# Patient Record
Sex: FEMALE | Race: OTHER | HISPANIC OR LATINO
[De-identification: names, ages, dates, MRNs, and addresses within clinical notes are randomized per-mention and may not be internally consistent; named-entity substitution may affect disease eponyms.]

---

## 2019-03-11 PROBLEM — Z00.00 ENCOUNTER FOR PREVENTIVE HEALTH EXAMINATION: Status: ACTIVE | Noted: 2019-03-11

## 2019-03-19 ENCOUNTER — APPOINTMENT (OUTPATIENT)
Dept: COLORECTAL SURGERY | Facility: CLINIC | Age: 64
End: 2019-03-19
Payer: MEDICAID

## 2019-03-19 VITALS
BODY MASS INDEX: 29.23 KG/M2 | SYSTOLIC BLOOD PRESSURE: 147 MMHG | HEIGHT: 59 IN | DIASTOLIC BLOOD PRESSURE: 78 MMHG | HEART RATE: 73 BPM | WEIGHT: 145 LBS | RESPIRATION RATE: 14 BRPM

## 2019-03-19 DIAGNOSIS — Z78.9 OTHER SPECIFIED HEALTH STATUS: ICD-10-CM

## 2019-03-19 DIAGNOSIS — Z86.79 PERSONAL HISTORY OF OTHER DISEASES OF THE CIRCULATORY SYSTEM: ICD-10-CM

## 2019-03-19 PROCEDURE — 99205 OFFICE O/P NEW HI 60 MIN: CPT

## 2019-03-19 RX ORDER — LISINOPRIL 10 MG/1
10 TABLET ORAL
Refills: 0 | Status: ACTIVE | COMMUNITY

## 2019-03-19 NOTE — ASSESSMENT
[FreeTextEntry1] : 63-year-old female with a large sigmoid colon mass/polyp highly suspicious for cancer. Recommend laparoscopic possible open sigmoid colon resection.Risk and benefits of the surgery have been discussed which include bleeding, infection, sepsis, multiorgan failure, inadvertent injury including hollow viscus, solid organ, ureter neurovascular and neurological structures, DVT PE, heart attack, stroke, hernias, recurrence of tumor, leakage of anastomosis requiring ostomy and death.

## 2019-03-19 NOTE — HISTORY OF PRESENT ILLNESS
[FreeTextEntry1] : 63-year-old  female found on colonoscopy in November of 2018 to have a very large mass/polyp of the sigmoid colon. Patient had issues with insurance and did not follow up with other doctors. She is seeing me for the first time today. She didn't currently denies any abdominal pain constipation nausea or vomiting or bleeding

## 2019-03-19 NOTE — CONSULT LETTER
[Consult Letter:] : I had the pleasure of evaluating your patient, [unfilled]. [Dear  ___] : Dear  [unfilled], [Please see my note below.] : Please see my note below. [Consult Closing:] : Thank you very much for allowing me to participate in the care of this patient.  If you have any questions, please do not hesitate to contact me. [FreeTextEntry3] : Vlad Hernandez M.D. FACS, FASCRS  [Sincerely,] : Sincerely,

## 2019-03-19 NOTE — DATA REVIEWED
[FreeTextEntry1] : Colonoscopy demonstrates a 5 cm mass in sigmoid colon\par CT scan demonstrates thickening of the sigmoid colon, no evidence of metastasis

## 2019-03-19 NOTE — PHYSICAL EXAM
[Abdomen Masses] : No abdominal masses [Abdomen Tenderness] : ~T No ~M abdominal tenderness [Tender] : nontender [None] : there was no rectal mass  [JVD] : no jugular venous distention  [Normal Breath Sounds] : Normal breath sounds [Normal Heart Sounds] : normal heart sounds [Normal Rate and Rhythm] : normal rate and rhythm [No Rash or Lesion] : No rash or lesion [Alert] : alert [Oriented to Person] : oriented to person [Oriented to Place] : oriented to place [Oriented to Time] : oriented to time [Calm] : calm [de-identified] : Obese, midline incision scar [de-identified] : Looks well in no distress, of stated age. [de-identified] : pupils equal reactive to light normocephalic atraumatic. [de-identified] : moves all 4 extremities appropriately with 5 over 5 strength

## 2019-04-01 ENCOUNTER — OUTPATIENT (OUTPATIENT)
Dept: OUTPATIENT SERVICES | Facility: HOSPITAL | Age: 64
LOS: 1 days | End: 2019-04-01
Payer: MEDICAID

## 2019-04-01 PROCEDURE — G9001: CPT

## 2019-04-02 ENCOUNTER — OUTPATIENT (OUTPATIENT)
Dept: OUTPATIENT SERVICES | Facility: HOSPITAL | Age: 64
LOS: 1 days | Discharge: HOME | End: 2019-04-02
Payer: MEDICAID

## 2019-04-02 VITALS
HEIGHT: 56.5 IN | TEMPERATURE: 98 F | RESPIRATION RATE: 16 BRPM | DIASTOLIC BLOOD PRESSURE: 55 MMHG | SYSTOLIC BLOOD PRESSURE: 154 MMHG | OXYGEN SATURATION: 100 % | WEIGHT: 145.06 LBS | HEART RATE: 72 BPM

## 2019-04-02 DIAGNOSIS — K66.0 PERITONEAL ADHESIONS (POSTPROCEDURAL) (POSTINFECTION): ICD-10-CM

## 2019-04-02 DIAGNOSIS — Z90.710 ACQUIRED ABSENCE OF BOTH CERVIX AND UTERUS: Chronic | ICD-10-CM

## 2019-04-02 DIAGNOSIS — Z29.9 ENCOUNTER FOR PROPHYLACTIC MEASURES, UNSPECIFIED: ICD-10-CM

## 2019-04-02 DIAGNOSIS — C18.7 MALIGNANT NEOPLASM OF SIGMOID COLON: ICD-10-CM

## 2019-04-02 DIAGNOSIS — Z98.891 HISTORY OF UTERINE SCAR FROM PREVIOUS SURGERY: Chronic | ICD-10-CM

## 2019-04-02 LAB
ABO RH CONFIRMATION: SIGNIFICANT CHANGE UP
ALBUMIN SERPL ELPH-MCNC: 4.1 G/DL — SIGNIFICANT CHANGE UP (ref 3.3–5)
ALP SERPL-CCNC: 142 U/L — HIGH (ref 40–120)
ALT FLD-CCNC: 27 U/L — SIGNIFICANT CHANGE UP (ref 12–78)
ANION GAP SERPL CALC-SCNC: 8 MMOL/L — SIGNIFICANT CHANGE UP (ref 5–17)
APTT BLD: 29.4 SEC — SIGNIFICANT CHANGE UP (ref 27.5–36.3)
AST SERPL-CCNC: 21 U/L — SIGNIFICANT CHANGE UP (ref 15–37)
BASOPHILS # BLD AUTO: 0.03 K/UL — SIGNIFICANT CHANGE UP (ref 0–0.2)
BASOPHILS NFR BLD AUTO: 0.4 % — SIGNIFICANT CHANGE UP (ref 0–2)
BILIRUB DIRECT SERPL-MCNC: <0.1 MG/DL — SIGNIFICANT CHANGE UP (ref 0–0.2)
BILIRUB SERPL-MCNC: 0.4 MG/DL — SIGNIFICANT CHANGE UP (ref 0.2–1.2)
BLD GP AB SCN SERPL QL: SIGNIFICANT CHANGE UP
BUN SERPL-MCNC: 16 MG/DL — SIGNIFICANT CHANGE UP (ref 7–23)
CALCIUM SERPL-MCNC: 9.2 MG/DL — SIGNIFICANT CHANGE UP (ref 8.5–10.1)
CHLORIDE SERPL-SCNC: 106 MMOL/L — SIGNIFICANT CHANGE UP (ref 96–108)
CO2 SERPL-SCNC: 27 MMOL/L — SIGNIFICANT CHANGE UP (ref 22–31)
CREAT SERPL-MCNC: 0.8 MG/DL — SIGNIFICANT CHANGE UP (ref 0.5–1.3)
EOSINOPHIL # BLD AUTO: 0.11 K/UL — SIGNIFICANT CHANGE UP (ref 0–0.5)
EOSINOPHIL NFR BLD AUTO: 1.3 % — SIGNIFICANT CHANGE UP (ref 0–6)
GLUCOSE SERPL-MCNC: 102 MG/DL — HIGH (ref 70–99)
HCT VFR BLD CALC: 44 % — SIGNIFICANT CHANGE UP (ref 34.5–45)
HGB BLD-MCNC: 14.7 G/DL — SIGNIFICANT CHANGE UP (ref 11.5–15.5)
IMM GRANULOCYTES NFR BLD AUTO: 0.2 % — SIGNIFICANT CHANGE UP (ref 0–1.5)
INR BLD: 1.1 RATIO — SIGNIFICANT CHANGE UP (ref 0.88–1.16)
LYMPHOCYTES # BLD AUTO: 2.09 K/UL — SIGNIFICANT CHANGE UP (ref 1–3.3)
LYMPHOCYTES # BLD AUTO: 24.7 % — SIGNIFICANT CHANGE UP (ref 13–44)
MCHC RBC-ENTMCNC: 30.5 PG — SIGNIFICANT CHANGE UP (ref 27–34)
MCHC RBC-ENTMCNC: 33.4 GM/DL — SIGNIFICANT CHANGE UP (ref 32–36)
MCV RBC AUTO: 91.3 FL — SIGNIFICANT CHANGE UP (ref 80–100)
MONOCYTES # BLD AUTO: 0.48 K/UL — SIGNIFICANT CHANGE UP (ref 0–0.9)
MONOCYTES NFR BLD AUTO: 5.7 % — SIGNIFICANT CHANGE UP (ref 2–14)
NEUTROPHILS # BLD AUTO: 5.73 K/UL — SIGNIFICANT CHANGE UP (ref 1.8–7.4)
NEUTROPHILS NFR BLD AUTO: 67.7 % — SIGNIFICANT CHANGE UP (ref 43–77)
NRBC # BLD: 0 /100 WBCS — SIGNIFICANT CHANGE UP (ref 0–0)
PLATELET # BLD AUTO: 200 K/UL — SIGNIFICANT CHANGE UP (ref 150–400)
POTASSIUM SERPL-MCNC: 3.8 MMOL/L — SIGNIFICANT CHANGE UP (ref 3.5–5.3)
POTASSIUM SERPL-SCNC: 3.8 MMOL/L — SIGNIFICANT CHANGE UP (ref 3.5–5.3)
PROT SERPL-MCNC: 8.1 GM/DL — SIGNIFICANT CHANGE UP (ref 6–8.3)
PROTHROM AB SERPL-ACNC: 12.2 SEC — SIGNIFICANT CHANGE UP (ref 10–12.9)
RBC # BLD: 4.82 M/UL — SIGNIFICANT CHANGE UP (ref 3.8–5.2)
RBC # FLD: 12.5 % — SIGNIFICANT CHANGE UP (ref 10.3–14.5)
SODIUM SERPL-SCNC: 141 MMOL/L — SIGNIFICANT CHANGE UP (ref 135–145)
TYPE + AB SCN PNL BLD: SIGNIFICANT CHANGE UP
WBC # BLD: 8.46 K/UL — SIGNIFICANT CHANGE UP (ref 3.8–10.5)
WBC # FLD AUTO: 8.46 K/UL — SIGNIFICANT CHANGE UP (ref 3.8–10.5)

## 2019-04-02 PROCEDURE — 71046 X-RAY EXAM CHEST 2 VIEWS: CPT | Mod: 26

## 2019-04-02 PROCEDURE — 93010 ELECTROCARDIOGRAM REPORT: CPT

## 2019-04-02 NOTE — H&P PST ADULT - NSANTHOSAYNRD_GEN_A_CORE
No. STEF screening performed.  STOP BANG Legend: 0-2 = LOW Risk; 3-4 = INTERMEDIATE Risk; 5-8 = HIGH Risk

## 2019-04-02 NOTE — H&P PST ADULT - HISTORY OF PRESENT ILLNESS
63 year old female with colon cancer; pt had colonoscopy in 2018 which found mass in the colon however did not follow up due to insurance; denies abdominal pain and change in bowel habits she presents to PST for planned procedure laparoscopic possible open sigmoid colon resection mobilization splenic flexure rigid proctosigmoidoscopy lysis of adhesions lymph node dissection possible ostomy 63 year old Banglades[place] Speaking female with colon cancer; pt had colonoscopy in 2018 which found mass in the colon however did not follow up due to insurance; denies abdominal pain and change in bowel habits she presents to PST for planned procedure laparoscopic possible open sigmoid colon resection mobilization splenic flexure rigid proctosigmoidoscopy lysis of adhesions lymph node dissection possible ostomy

## 2019-04-02 NOTE — H&P PST ADULT - ASSESSMENT
63 year old female presents to PST for planned procedure laparoscopic possible open sigmoid colon resection mobilization splenic flexure rigid proctosigmoidoscopy lysis of adhesions lymph node dissection possible ostomy     Plan:  1. PST instructions given ; NPO post midnight   2. Pt instructed to take following meds with sip of water : lisinopril  3. EZ wash instructions given   4. Medical Evaluation with Dr Almanzar CAPRINI SCORE [CLOT]    AGE RELATED RISK FACTORS                                                       MOBILITY RELATED FACTORS  [ ] Age 41-60 years                                            (1 Point)                  [ ] Bed rest                                                        (1 Point)  [ x] Age: 61-74 years                                           (2 Points)                 [ ] Plaster cast                                                   (2 Points)  [ ] Age= 75 years                                              (3 Points)                 [ ] Bed bound for more than 72 hours                 (2 Points)    DISEASE RELATED RISK FACTORS                                               GENDER SPECIFIC FACTORS  [ ] Edema in the lower extremities                       (1 Point)                  [ ] Pregnancy                                                     (1 Point)  [ ] Varicose veins                                               (1 Point)                  [ ] Post-partum < 6 weeks                                   (1 Point)             [ x] BMI > 25 Kg/m2                                            (1 Point)                  [ ] Hormonal therapy  or oral contraception          (1 Point)                 [ ] Sepsis (in the previous month)                        (1 Point)                  [ ] History of pregnancy complications                 (1 point)  [ ] Pneumonia or serious lung disease                                               [ ] Unexplained or recurrent                     (1 Point)           (in the previous month)                               (1 Point)  [ ] Abnormal pulmonary function test                     (1 Point)                 SURGERY RELATED RISK FACTORS  [ ] Acute myocardial infarction                              (1 Point)                 [ ]  Section                                             (1 Point)  [ ] Congestive heart failure (in the previous month)  (1 Point)               [ ] Minor surgery                                                  (1 Point)   [ ] Inflammatory bowel disease                             (1 Point)                 [ ] Arthroscopic surgery                                        (2 Points)  [ ] Central venous access                                      (2 Points)                [x ] General surgery lasting more than 45 minutes   (2 Points)       [ ] Stroke (in the previous month)                          (5 Points)               [ ] Elective arthroplasty                                         (5 Points)            (x ) past and present malignancy                             ( 2 points)                                                                                                                                    HEMATOLOGY RELATED FACTORS                                                 TRAUMA RELATED RISK FACTORS  [ ] Prior episodes of VTE                                     (3 Points)                 [ ] Fracture of the hip, pelvis, or leg                       (5 Points)  [ ] Positive family history for VTE                         (3 Points)                 [ ] Acute spinal cord injury (in the previous month)  (5 Points)  [ ] Prothrombin 72196 A                                     (3 Points)                 [ ] Paralysis  (less than 1 month)                             (5 Points)  [ ] Factor V Leiden                                             (3 Points)                  [ ] Multiple Trauma within 1 month                        (5 Points)  [ ] Lupus anticoagulants                                     (3 Points)                                                           [ ] Anticardiolipin antibodies                               (3 Points)                                                       [ ] High homocysteine in the blood                      (3 Points)                                             [ ] Other congenital or acquired thrombophilia      (3 Points)                                                [ ] Heparin induced thrombocytopenia                  (3 Points)                                          Total Score [  7        ]

## 2019-04-02 NOTE — H&P PST ADULT - NSICDXPROBLEM_GEN_ALL_CORE_FT
PROBLEM DIAGNOSES  Problem: Need for prophylactic measure  Assessment and Plan: The Caprini score indicates that this patient is at high risk for a VTE event (score 6 or greater). Surgical patients in this group will benefit from both pharmacologic prophylaxis and intermittent compression devices.  The surgical team will determine the balance between VTE risk and bleeding risk, and other clinical considerations

## 2019-04-02 NOTE — H&P PST ADULT - LANGUAGE ASSISTANCE NEEDED
grandson Eduardo Reyes helped interpret/Yes-Patient/Caregiver accepts free interpretation services...

## 2019-04-03 PROBLEM — Z86.79 PERSONAL HISTORY OF OTHER DISEASES OF THE CIRCULATORY SYSTEM: Chronic | Status: ACTIVE | Noted: 2019-04-02

## 2019-04-03 LAB
CEA SERPL-MCNC: 2.1 NG/ML — SIGNIFICANT CHANGE UP (ref 0–3.8)
HBA1C BLD-MCNC: 5.8 % — HIGH (ref 4–5.6)

## 2019-04-04 PROBLEM — I10 ESSENTIAL (PRIMARY) HYPERTENSION: Chronic | Status: ACTIVE | Noted: 2019-04-02

## 2019-04-04 PROBLEM — C18.9 MALIGNANT NEOPLASM OF COLON, UNSPECIFIED: Chronic | Status: ACTIVE | Noted: 2019-04-02

## 2019-04-08 RX ORDER — SODIUM CHLORIDE 9 MG/ML
3 INJECTION INTRAMUSCULAR; INTRAVENOUS; SUBCUTANEOUS EVERY 8 HOURS
Qty: 0 | Refills: 0 | Status: DISCONTINUED | OUTPATIENT
Start: 2019-04-11 | End: 2019-04-14

## 2019-04-10 ENCOUNTER — RESULT REVIEW (OUTPATIENT)
Age: 64
End: 2019-04-10

## 2019-04-10 ENCOUNTER — OTHER (OUTPATIENT)
Age: 64
End: 2019-04-10

## 2019-04-10 ENCOUNTER — OUTPATIENT (OUTPATIENT)
Dept: OUTPATIENT SERVICES | Facility: HOSPITAL | Age: 64
LOS: 1 days | Discharge: HOME | End: 2019-04-10
Payer: MEDICAID

## 2019-04-10 DIAGNOSIS — Z98.891 HISTORY OF UTERINE SCAR FROM PREVIOUS SURGERY: Chronic | ICD-10-CM

## 2019-04-10 DIAGNOSIS — C18.7 MALIGNANT NEOPLASM OF SIGMOID COLON: ICD-10-CM

## 2019-04-10 DIAGNOSIS — Z90.710 ACQUIRED ABSENCE OF BOTH CERVIX AND UTERUS: Chronic | ICD-10-CM

## 2019-04-10 PROCEDURE — 88321 CONSLTJ&REPRT SLD PREP ELSWR: CPT

## 2019-04-11 ENCOUNTER — RESULT REVIEW (OUTPATIENT)
Age: 64
End: 2019-04-11

## 2019-04-11 ENCOUNTER — APPOINTMENT (OUTPATIENT)
Dept: COLORECTAL SURGERY | Facility: HOSPITAL | Age: 64
End: 2019-04-11
Payer: MEDICAID

## 2019-04-11 ENCOUNTER — INPATIENT (INPATIENT)
Facility: HOSPITAL | Age: 64
LOS: 2 days | Discharge: HOME | End: 2019-04-14
Attending: SURGERY | Admitting: SURGERY

## 2019-04-11 VITALS
WEIGHT: 145.06 LBS | HEART RATE: 74 BPM | HEIGHT: 57 IN | OXYGEN SATURATION: 97 % | TEMPERATURE: 98 F | RESPIRATION RATE: 16 BRPM | DIASTOLIC BLOOD PRESSURE: 70 MMHG | SYSTOLIC BLOOD PRESSURE: 138 MMHG

## 2019-04-11 DIAGNOSIS — Z98.891 HISTORY OF UTERINE SCAR FROM PREVIOUS SURGERY: Chronic | ICD-10-CM

## 2019-04-11 DIAGNOSIS — Z90.710 ACQUIRED ABSENCE OF BOTH CERVIX AND UTERUS: Chronic | ICD-10-CM

## 2019-04-11 LAB
GLUCOSE BLDC GLUCOMTR-MCNC: 103 MG/DL — HIGH (ref 70–99)
GLUCOSE BLDC GLUCOMTR-MCNC: 117 MG/DL — HIGH (ref 70–99)
GLUCOSE BLDC GLUCOMTR-MCNC: 125 MG/DL — HIGH (ref 70–99)
GLUCOSE BLDC GLUCOMTR-MCNC: 130 MG/DL — HIGH (ref 70–99)
GLUCOSE BLDC GLUCOMTR-MCNC: 134 MG/DL — HIGH (ref 70–99)
GLUCOSE BLDC GLUCOMTR-MCNC: 169 MG/DL — HIGH (ref 70–99)

## 2019-04-11 PROCEDURE — 44213 LAP MOBIL SPLENIC FL ADD-ON: CPT

## 2019-04-11 PROCEDURE — 58660 LAPAROSCOPY LYSIS: CPT | Mod: AS

## 2019-04-11 PROCEDURE — 44207 L COLECTOMY/COLOPROCTOSTOMY: CPT

## 2019-04-11 PROCEDURE — 58660 LAPAROSCOPY LYSIS: CPT

## 2019-04-11 PROCEDURE — 45300 PROCTOSIGMOIDOSCOPY DX: CPT

## 2019-04-11 PROCEDURE — 44207 L COLECTOMY/COLOPROCTOSTOMY: CPT | Mod: AS

## 2019-04-11 PROCEDURE — 44213 LAP MOBIL SPLENIC FL ADD-ON: CPT | Mod: AS

## 2019-04-11 PROCEDURE — 88305 TISSUE EXAM BY PATHOLOGIST: CPT | Mod: 26

## 2019-04-11 PROCEDURE — 88309 TISSUE EXAM BY PATHOLOGIST: CPT | Mod: 26

## 2019-04-11 RX ORDER — HEPARIN SODIUM 5000 [USP'U]/ML
5000 INJECTION INTRAVENOUS; SUBCUTANEOUS EVERY 8 HOURS
Qty: 0 | Refills: 0 | Status: DISCONTINUED | OUTPATIENT
Start: 2019-04-12 | End: 2019-04-14

## 2019-04-11 RX ORDER — LISINOPRIL 2.5 MG/1
10 TABLET ORAL DAILY
Qty: 0 | Refills: 0 | Status: DISCONTINUED | OUTPATIENT
Start: 2019-04-11 | End: 2019-04-14

## 2019-04-11 RX ORDER — ONDANSETRON 8 MG/1
4 TABLET, FILM COATED ORAL EVERY 6 HOURS
Qty: 0 | Refills: 0 | Status: DISCONTINUED | OUTPATIENT
Start: 2019-04-11 | End: 2019-04-14

## 2019-04-11 RX ORDER — ACETAMINOPHEN 500 MG
1000 TABLET ORAL ONCE
Qty: 0 | Refills: 0 | Status: COMPLETED | OUTPATIENT
Start: 2019-04-11 | End: 2019-04-11

## 2019-04-11 RX ORDER — ONDANSETRON 8 MG/1
4 TABLET, FILM COATED ORAL ONCE
Qty: 0 | Refills: 0 | Status: DISCONTINUED | OUTPATIENT
Start: 2019-04-11 | End: 2019-04-11

## 2019-04-11 RX ORDER — SODIUM CHLORIDE 9 MG/ML
1000 INJECTION, SOLUTION INTRAVENOUS
Qty: 0 | Refills: 0 | Status: DISCONTINUED | OUTPATIENT
Start: 2019-04-11 | End: 2019-04-13

## 2019-04-11 RX ORDER — ACETAMINOPHEN 500 MG
650 TABLET ORAL EVERY 6 HOURS
Qty: 0 | Refills: 0 | Status: DISCONTINUED | OUTPATIENT
Start: 2019-04-11 | End: 2019-04-14

## 2019-04-11 RX ORDER — ALVIMOPAN 12 MG/1
12 CAPSULE ORAL ONCE
Qty: 0 | Refills: 0 | Status: COMPLETED | OUTPATIENT
Start: 2019-04-11 | End: 2019-04-11

## 2019-04-11 RX ORDER — OXYCODONE AND ACETAMINOPHEN 5; 325 MG/1; MG/1
2 TABLET ORAL EVERY 4 HOURS
Qty: 0 | Refills: 0 | Status: DISCONTINUED | OUTPATIENT
Start: 2019-04-11 | End: 2019-04-14

## 2019-04-11 RX ORDER — CEFOTETAN DISODIUM 1 G
2 VIAL (EA) INJECTION ONCE
Qty: 0 | Refills: 0 | Status: COMPLETED | OUTPATIENT
Start: 2019-04-11 | End: 2019-04-11

## 2019-04-11 RX ORDER — ALVIMOPAN 12 MG/1
12 CAPSULE ORAL
Qty: 0 | Refills: 0 | Status: DISCONTINUED | OUTPATIENT
Start: 2019-04-12 | End: 2019-04-14

## 2019-04-11 RX ORDER — OXYCODONE HYDROCHLORIDE 5 MG/1
10 TABLET ORAL ONCE
Qty: 0 | Refills: 0 | Status: DISCONTINUED | OUTPATIENT
Start: 2019-04-11 | End: 2019-04-11

## 2019-04-11 RX ORDER — FENTANYL CITRATE 50 UG/ML
50 INJECTION INTRAVENOUS
Qty: 0 | Refills: 0 | Status: DISCONTINUED | OUTPATIENT
Start: 2019-04-11 | End: 2019-04-11

## 2019-04-11 RX ORDER — HEPARIN SODIUM 5000 [USP'U]/ML
5000 INJECTION INTRAVENOUS; SUBCUTANEOUS ONCE
Qty: 0 | Refills: 0 | Status: COMPLETED | OUTPATIENT
Start: 2019-04-11 | End: 2019-04-11

## 2019-04-11 RX ORDER — SODIUM CHLORIDE 9 MG/ML
1000 INJECTION, SOLUTION INTRAVENOUS
Qty: 0 | Refills: 0 | Status: DISCONTINUED | OUTPATIENT
Start: 2019-04-11 | End: 2019-04-11

## 2019-04-11 RX ORDER — OXYCODONE AND ACETAMINOPHEN 5; 325 MG/1; MG/1
1 TABLET ORAL EVERY 4 HOURS
Qty: 0 | Refills: 0 | Status: DISCONTINUED | OUTPATIENT
Start: 2019-04-11 | End: 2019-04-14

## 2019-04-11 RX ADMIN — FENTANYL CITRATE 50 MICROGRAM(S): 50 INJECTION INTRAVENOUS at 11:30

## 2019-04-11 RX ADMIN — SODIUM CHLORIDE 75 MILLILITER(S): 9 INJECTION, SOLUTION INTRAVENOUS at 11:03

## 2019-04-11 RX ADMIN — ONDANSETRON 4 MILLIGRAM(S): 8 TABLET, FILM COATED ORAL at 20:53

## 2019-04-11 RX ADMIN — ALVIMOPAN 12 MILLIGRAM(S): 12 CAPSULE ORAL at 07:20

## 2019-04-11 RX ADMIN — Medication 1000 MILLIGRAM(S): at 18:15

## 2019-04-11 RX ADMIN — OXYCODONE AND ACETAMINOPHEN 1 TABLET(S): 5; 325 TABLET ORAL at 14:26

## 2019-04-11 RX ADMIN — FENTANYL CITRATE 50 MICROGRAM(S): 50 INJECTION INTRAVENOUS at 11:00

## 2019-04-11 RX ADMIN — SODIUM CHLORIDE 125 MILLILITER(S): 9 INJECTION, SOLUTION INTRAVENOUS at 23:02

## 2019-04-11 RX ADMIN — Medication 400 MILLIGRAM(S): at 17:37

## 2019-04-11 RX ADMIN — Medication 100 GRAM(S): at 22:28

## 2019-04-11 RX ADMIN — SODIUM CHLORIDE 125 MILLILITER(S): 9 INJECTION, SOLUTION INTRAVENOUS at 14:27

## 2019-04-11 RX ADMIN — HEPARIN SODIUM 5000 UNIT(S): 5000 INJECTION INTRAVENOUS; SUBCUTANEOUS at 07:20

## 2019-04-11 NOTE — BRIEF OPERATIVE NOTE - NSICDXBRIEFPROCEDURE_GEN_ALL_CORE_FT
PROCEDURES:  Rigid proctosigmoidoscpy 11-Apr-2019 10:36:37  Pilo Gonzalez  Mobilization of splenic flexure 11-Apr-2019 10:36:28  Pilo Gonzalez  Robot-assisted laparoscopic left colectomy or sigmoidectomy using da Marialuisa Xi 11-Apr-2019 10:35:57  Pilo Gonzalez

## 2019-04-12 DIAGNOSIS — Z71.89 OTHER SPECIFIED COUNSELING: ICD-10-CM

## 2019-04-12 LAB
ANION GAP SERPL CALC-SCNC: 9 MMOL/L — SIGNIFICANT CHANGE UP (ref 5–17)
BASOPHILS # BLD AUTO: 0.01 K/UL — SIGNIFICANT CHANGE UP (ref 0–0.2)
BASOPHILS NFR BLD AUTO: 0.1 % — SIGNIFICANT CHANGE UP (ref 0–2)
BUN SERPL-MCNC: 14 MG/DL — SIGNIFICANT CHANGE UP (ref 7–23)
CALCIUM SERPL-MCNC: 8.4 MG/DL — LOW (ref 8.5–10.1)
CHLORIDE SERPL-SCNC: 108 MMOL/L — SIGNIFICANT CHANGE UP (ref 96–108)
CO2 SERPL-SCNC: 26 MMOL/L — SIGNIFICANT CHANGE UP (ref 22–31)
CREAT SERPL-MCNC: 0.98 MG/DL — SIGNIFICANT CHANGE UP (ref 0.5–1.3)
EOSINOPHIL # BLD AUTO: 0 K/UL — SIGNIFICANT CHANGE UP (ref 0–0.5)
EOSINOPHIL NFR BLD AUTO: 0 % — SIGNIFICANT CHANGE UP (ref 0–6)
GLUCOSE BLDC GLUCOMTR-MCNC: 134 MG/DL — HIGH (ref 70–99)
GLUCOSE BLDC GLUCOMTR-MCNC: 142 MG/DL — HIGH (ref 70–99)
GLUCOSE BLDC GLUCOMTR-MCNC: 92 MG/DL — SIGNIFICANT CHANGE UP (ref 70–99)
GLUCOSE BLDC GLUCOMTR-MCNC: 94 MG/DL — SIGNIFICANT CHANGE UP (ref 70–99)
GLUCOSE BLDC GLUCOMTR-MCNC: 99 MG/DL — SIGNIFICANT CHANGE UP (ref 70–99)
GLUCOSE SERPL-MCNC: 105 MG/DL — HIGH (ref 70–99)
HCT VFR BLD CALC: 37.1 % — SIGNIFICANT CHANGE UP (ref 34.5–45)
HGB BLD-MCNC: 12.3 G/DL — SIGNIFICANT CHANGE UP (ref 11.5–15.5)
IMM GRANULOCYTES NFR BLD AUTO: 0.5 % — SIGNIFICANT CHANGE UP (ref 0–1.5)
LYMPHOCYTES # BLD AUTO: 1.47 K/UL — SIGNIFICANT CHANGE UP (ref 1–3.3)
LYMPHOCYTES # BLD AUTO: 13.5 % — SIGNIFICANT CHANGE UP (ref 13–44)
MAGNESIUM SERPL-MCNC: 2.2 MG/DL — SIGNIFICANT CHANGE UP (ref 1.6–2.6)
MCHC RBC-ENTMCNC: 30.5 PG — SIGNIFICANT CHANGE UP (ref 27–34)
MCHC RBC-ENTMCNC: 33.2 GM/DL — SIGNIFICANT CHANGE UP (ref 32–36)
MCV RBC AUTO: 92.1 FL — SIGNIFICANT CHANGE UP (ref 80–100)
MONOCYTES # BLD AUTO: 0.9 K/UL — SIGNIFICANT CHANGE UP (ref 0–0.9)
MONOCYTES NFR BLD AUTO: 8.2 % — SIGNIFICANT CHANGE UP (ref 2–14)
NEUTROPHILS # BLD AUTO: 8.49 K/UL — HIGH (ref 1.8–7.4)
NEUTROPHILS NFR BLD AUTO: 77.7 % — HIGH (ref 43–77)
NRBC # BLD: 0 /100 WBCS — SIGNIFICANT CHANGE UP (ref 0–0)
PHOSPHATE SERPL-MCNC: 3.7 MG/DL — SIGNIFICANT CHANGE UP (ref 2.5–4.5)
PLATELET # BLD AUTO: 168 K/UL — SIGNIFICANT CHANGE UP (ref 150–400)
POTASSIUM SERPL-MCNC: 4.2 MMOL/L — SIGNIFICANT CHANGE UP (ref 3.5–5.3)
POTASSIUM SERPL-SCNC: 4.2 MMOL/L — SIGNIFICANT CHANGE UP (ref 3.5–5.3)
RBC # BLD: 4.03 M/UL — SIGNIFICANT CHANGE UP (ref 3.8–5.2)
RBC # FLD: 12.8 % — SIGNIFICANT CHANGE UP (ref 10.3–14.5)
SODIUM SERPL-SCNC: 143 MMOL/L — SIGNIFICANT CHANGE UP (ref 135–145)
SURGICAL PATHOLOGY STUDY: SIGNIFICANT CHANGE UP
WBC # BLD: 10.92 K/UL — HIGH (ref 3.8–10.5)
WBC # FLD AUTO: 10.92 K/UL — HIGH (ref 3.8–10.5)

## 2019-04-12 RX ORDER — KETOROLAC TROMETHAMINE 30 MG/ML
15 SYRINGE (ML) INJECTION EVERY 6 HOURS
Qty: 0 | Refills: 0 | Status: DISCONTINUED | OUTPATIENT
Start: 2019-04-12 | End: 2019-04-14

## 2019-04-12 RX ADMIN — Medication 15 MILLIGRAM(S): at 21:26

## 2019-04-12 RX ADMIN — Medication 15 MILLIGRAM(S): at 14:30

## 2019-04-12 RX ADMIN — Medication 15 MILLIGRAM(S): at 21:41

## 2019-04-12 RX ADMIN — LISINOPRIL 10 MILLIGRAM(S): 2.5 TABLET ORAL at 06:17

## 2019-04-12 RX ADMIN — HEPARIN SODIUM 5000 UNIT(S): 5000 INJECTION INTRAVENOUS; SUBCUTANEOUS at 21:30

## 2019-04-12 RX ADMIN — HEPARIN SODIUM 5000 UNIT(S): 5000 INJECTION INTRAVENOUS; SUBCUTANEOUS at 06:17

## 2019-04-12 RX ADMIN — Medication 15 MILLIGRAM(S): at 18:17

## 2019-04-12 RX ADMIN — SODIUM CHLORIDE 3 MILLILITER(S): 9 INJECTION INTRAMUSCULAR; INTRAVENOUS; SUBCUTANEOUS at 21:32

## 2019-04-12 RX ADMIN — HEPARIN SODIUM 5000 UNIT(S): 5000 INJECTION INTRAVENOUS; SUBCUTANEOUS at 14:00

## 2019-04-12 RX ADMIN — ALVIMOPAN 12 MILLIGRAM(S): 12 CAPSULE ORAL at 06:17

## 2019-04-12 RX ADMIN — SODIUM CHLORIDE 125 MILLILITER(S): 9 INJECTION, SOLUTION INTRAVENOUS at 06:27

## 2019-04-12 RX ADMIN — ALVIMOPAN 12 MILLIGRAM(S): 12 CAPSULE ORAL at 18:18

## 2019-04-12 RX ADMIN — Medication 15 MILLIGRAM(S): at 14:00

## 2019-04-12 NOTE — PROGRESS NOTE ADULT - ASSESSMENT
POD 1 s/p robotic left colectomy for colon cancer. Doing well. Await bowel function    Liquid diet today  Wean IVFs  Prn pain medication  Ambulate and IS  VTE prophylaxis

## 2019-04-12 NOTE — PHYSICAL THERAPY INITIAL EVALUATION ADULT - DID THE PATIENT HAVE SURGERY?
s/p robot assisted, da Marialuisa XI,  laparoscopic sigmoid colon resection mobilization splenic flexure rigid proctosigmoidoscopy lysis of adhesions lymph node dissection/yes

## 2019-04-12 NOTE — PROGRESS NOTE ADULT - SUBJECTIVE AND OBJECTIVE BOX
No issues overnight. Pain well controlled.     exam:  Vital Signs Last 24 Hrs  T(C): 37.2 (12 Apr 2019 04:07), Max: 37.7 (11 Apr 2019 20:30)  T(F): 98.9 (12 Apr 2019 04:07), Max: 99.9 (11 Apr 2019 20:30)  HR: 71 (12 Apr 2019 04:07) (71 - 96)  BP: 124/52 (12 Apr 2019 04:07) (114/51 - 136/53)  RR: 18 (12 Apr 2019 04:07) (14 - 18)  SpO2: 95% (12 Apr 2019 04:07) (92% - 100%)    In no distress  Non labored breathing  Abdomen soft, non distended, appropriately tender. Incision covered with prevena  Alert and oriented x 3                          12.3   10.92 )-----------( 168      ( 12 Apr 2019 05:15 )             37.1     04-12    143  |  108  |  14  ----------------------------<  105<H>  4.2   |  26  |  0.98    Ca    8.4<L>      12 Apr 2019 05:15  Phos  3.7     04-12  Mg     2.2     04-12      MEDICATIONS  (STANDING):  alvimopan 12 milliGRAM(s) Oral two times a day  heparin  Injectable 5000 Unit(s) SubCutaneous every 8 hours  ketorolac   Injectable 15 milliGRAM(s) IV Push every 6 hours  lactated ringers. 1000 milliLiter(s) (75 mL/Hr) IV Continuous <Continuous>  lisinopril 10 milliGRAM(s) Oral daily  sodium chloride 0.9% lock flush 3 milliLiter(s) IV Push every 8 hours

## 2019-04-12 NOTE — PHYSICAL THERAPY INITIAL EVALUATION ADULT - MODALITIES TREATMENT COMMENTS
Patient  left in chair with CBIR and chair alarm active.  B SCDs replaced, wound vac in place. Patient instructed to call for assistance back to bed or the bathroom, understands same.  RN informed of session/status.

## 2019-04-12 NOTE — PHYSICAL THERAPY INITIAL EVALUATION ADULT - GENERAL OBSERVATIONS, REHAB EVAL
Patient received in bed, + B SCDs, + portable wound vac.  [employer]  phone used for communication. ID# [id number], Mariana.

## 2019-04-13 LAB — GLUCOSE BLDC GLUCOMTR-MCNC: 101 MG/DL — HIGH (ref 70–99)

## 2019-04-13 RX ADMIN — SODIUM CHLORIDE 3 MILLILITER(S): 9 INJECTION INTRAMUSCULAR; INTRAVENOUS; SUBCUTANEOUS at 14:18

## 2019-04-13 RX ADMIN — Medication 15 MILLIGRAM(S): at 23:40

## 2019-04-13 RX ADMIN — SODIUM CHLORIDE 75 MILLILITER(S): 9 INJECTION, SOLUTION INTRAVENOUS at 06:35

## 2019-04-13 RX ADMIN — Medication 15 MILLIGRAM(S): at 12:45

## 2019-04-13 RX ADMIN — HEPARIN SODIUM 5000 UNIT(S): 5000 INJECTION INTRAVENOUS; SUBCUTANEOUS at 23:16

## 2019-04-13 RX ADMIN — ALVIMOPAN 12 MILLIGRAM(S): 12 CAPSULE ORAL at 06:36

## 2019-04-13 RX ADMIN — ALVIMOPAN 12 MILLIGRAM(S): 12 CAPSULE ORAL at 17:29

## 2019-04-13 RX ADMIN — Medication 15 MILLIGRAM(S): at 23:16

## 2019-04-13 RX ADMIN — HEPARIN SODIUM 5000 UNIT(S): 5000 INJECTION INTRAVENOUS; SUBCUTANEOUS at 14:25

## 2019-04-13 RX ADMIN — Medication 15 MILLIGRAM(S): at 17:30

## 2019-04-13 RX ADMIN — Medication 15 MILLIGRAM(S): at 06:36

## 2019-04-13 RX ADMIN — Medication 15 MILLIGRAM(S): at 12:15

## 2019-04-13 RX ADMIN — SODIUM CHLORIDE 3 MILLILITER(S): 9 INJECTION INTRAMUSCULAR; INTRAVENOUS; SUBCUTANEOUS at 06:45

## 2019-04-13 RX ADMIN — LISINOPRIL 10 MILLIGRAM(S): 2.5 TABLET ORAL at 06:36

## 2019-04-13 RX ADMIN — Medication 15 MILLIGRAM(S): at 06:51

## 2019-04-13 RX ADMIN — HEPARIN SODIUM 5000 UNIT(S): 5000 INJECTION INTRAVENOUS; SUBCUTANEOUS at 06:36

## 2019-04-13 RX ADMIN — SODIUM CHLORIDE 3 MILLILITER(S): 9 INJECTION INTRAMUSCULAR; INTRAVENOUS; SUBCUTANEOUS at 21:15

## 2019-04-13 NOTE — PROGRESS NOTE ADULT - SUBJECTIVE AND OBJECTIVE BOX
Feels well. Tolerating liquids. Pain well controlled.     Exam:  Vital Signs Last 24 Hrs  T(C): 36.9 (13 Apr 2019 04:25), Max: 37.1 (12 Apr 2019 20:27)  T(F): 98.4 (13 Apr 2019 04:25), Max: 98.7 (12 Apr 2019 20:27)  HR: 71 (13 Apr 2019 04:25) (71 - 76)  BP: 133/55 (13 Apr 2019 04:25) (133/55 - 146/51)  RR: 18 (13 Apr 2019 04:25) (17 - 18)  SpO2: 95% (13 Apr 2019 04:25) (95% - 99%)    In no distress  Non labored breathing  Abdomen soft, non distended, non tender. Incision covered with prevena  Alert and oriented x 3    MEDICATIONS  (STANDING):  alvimopan 12 milliGRAM(s) Oral two times a day  heparin  Injectable 5000 Unit(s) SubCutaneous every 8 hours  ketorolac   Injectable 15 milliGRAM(s) IV Push every 6 hours  lisinopril 10 milliGRAM(s) Oral daily  sodium chloride 0.9% lock flush 3 milliLiter(s) IV Push every 8 hours

## 2019-04-13 NOTE — PROGRESS NOTE ADULT - ASSESSMENT
POD 2 s/p robotic left colectomy for colon cancer. Doing well.    Advance diet as tolerated  Stop IVFs  Prn pain medication  Ambulate and IS  VTE prophylaxis  Anticipate discharge home tomorrow

## 2019-04-14 ENCOUNTER — TRANSCRIPTION ENCOUNTER (OUTPATIENT)
Age: 64
End: 2019-04-14

## 2019-04-14 VITALS
HEART RATE: 80 BPM | OXYGEN SATURATION: 97 % | RESPIRATION RATE: 16 BRPM | SYSTOLIC BLOOD PRESSURE: 130 MMHG | TEMPERATURE: 98 F | DIASTOLIC BLOOD PRESSURE: 45 MMHG

## 2019-04-14 RX ORDER — METRONIDAZOLE 500 MG
1 TABLET ORAL
Qty: 0 | Refills: 0 | COMMUNITY

## 2019-04-14 RX ORDER — NEOMYCIN SULFATE 500 MG/1
1 TABLET ORAL
Qty: 0 | Refills: 0 | COMMUNITY

## 2019-04-14 RX ADMIN — Medication 15 MILLIGRAM(S): at 05:18

## 2019-04-14 RX ADMIN — LISINOPRIL 10 MILLIGRAM(S): 2.5 TABLET ORAL at 05:17

## 2019-04-14 RX ADMIN — ALVIMOPAN 12 MILLIGRAM(S): 12 CAPSULE ORAL at 05:17

## 2019-04-14 RX ADMIN — Medication 15 MILLIGRAM(S): at 05:45

## 2019-04-14 RX ADMIN — SODIUM CHLORIDE 3 MILLILITER(S): 9 INJECTION INTRAMUSCULAR; INTRAVENOUS; SUBCUTANEOUS at 05:16

## 2019-04-14 RX ADMIN — HEPARIN SODIUM 5000 UNIT(S): 5000 INJECTION INTRAVENOUS; SUBCUTANEOUS at 05:18

## 2019-04-14 NOTE — DISCHARGE NOTE PROVIDER - CARE PROVIDER_API CALL
Vlad Hernandez)  ColonRectal Surgery; Surgery  [address] [place] [address]  Phone: [phone]  Fax: [phone]  Follow Up Time:

## 2019-04-14 NOTE — DISCHARGE NOTE NURSING/CASE MANAGEMENT/SOCIAL WORK - NSDCDPATPORTLINK_GEN_ALL_CORE
You can access the [facility] Portal, offered by [facility], by registering with the following website: [email or web address]

## 2019-04-14 NOTE — DISCHARGE NOTE PROVIDER - NSDCFUADDINST_GEN_ALL_CORE_FT
- Stay on low fiber diet.   - Okay to shower with soap and water, dap incisions dry and leave open to air.   - No heavy lifting (>10 pounds), pushing or straining. Stairs and walking okay.  - Call surgeon if you develop fevers  > 101, persistent nausea or vomiting, worsening abdominal pain, pus draining from incisions or concerns for wound infection.   - Call office to make follow up appointment for 2 weeks.

## 2019-04-14 NOTE — DISCHARGE NOTE PROVIDER - NSDCCPTREATMENT_GEN_ALL_CORE_FT
PRINCIPAL PROCEDURE  Procedure: Robot-assisted laparoscopic left colectomy or sigmoidectomy using da Marialuisa Xi  Findings and Treatment:       SECONDARY PROCEDURE  Procedure: Rigid proctosigmoidoscpy  Findings and Treatment:     Procedure: Mobilization of splenic flexure  Findings and Treatment:     Procedure: Robot-assisted laparoscopic left colectomy or sigmoidectomy using da Marialuisa Xi  Findings and Treatment:

## 2019-04-14 NOTE — DISCHARGE NOTE PROVIDER - HOSPITAL COURSE
63 year old female admitted for colon surgery for colon cancer. She underwent a robotic left colectomy for colon cancer. Her post operative course was unremarkable. She was tolerating diet and having bowel function and stable for discharge on POD 3.

## 2019-04-15 DIAGNOSIS — C18.7 MALIGNANT NEOPLASM OF SIGMOID COLON: ICD-10-CM

## 2019-04-15 DIAGNOSIS — K66.0 PERITONEAL ADHESIONS (POSTPROCEDURAL) (POSTINFECTION): ICD-10-CM

## 2019-04-15 DIAGNOSIS — Z01.818 ENCOUNTER FOR OTHER PREPROCEDURAL EXAMINATION: ICD-10-CM

## 2019-04-16 LAB — SURGICAL PATHOLOGY STUDY: SIGNIFICANT CHANGE UP

## 2019-04-17 ENCOUNTER — OTHER (OUTPATIENT)
Age: 64
End: 2019-04-17

## 2019-04-17 DIAGNOSIS — C18.7 MALIGNANT NEOPLASM OF SIGMOID COLON: ICD-10-CM

## 2019-04-22 DIAGNOSIS — C18.7 MALIGNANT NEOPLASM OF SIGMOID COLON: ICD-10-CM

## 2019-04-22 DIAGNOSIS — I10 ESSENTIAL (PRIMARY) HYPERTENSION: ICD-10-CM

## 2019-04-22 DIAGNOSIS — E66.01 MORBID (SEVERE) OBESITY DUE TO EXCESS CALORIES: ICD-10-CM

## 2019-05-10 ENCOUNTER — APPOINTMENT (OUTPATIENT)
Dept: COLORECTAL SURGERY | Facility: CLINIC | Age: 64
End: 2019-05-10

## 2019-05-10 ENCOUNTER — APPOINTMENT (OUTPATIENT)
Dept: COLORECTAL SURGERY | Facility: CLINIC | Age: 64
End: 2019-05-10
Payer: MEDICAID

## 2019-05-10 ENCOUNTER — OTHER (OUTPATIENT)
Age: 64
End: 2019-05-10

## 2019-05-10 VITALS
WEIGHT: 142 LBS | DIASTOLIC BLOOD PRESSURE: 80 MMHG | HEIGHT: 59 IN | BODY MASS INDEX: 28.63 KG/M2 | SYSTOLIC BLOOD PRESSURE: 149 MMHG

## 2019-05-10 DIAGNOSIS — Z90.49 ACQUIRED ABSENCE OF OTHER SPECIFIED PARTS OF DIGESTIVE TRACT: ICD-10-CM

## 2019-05-10 PROCEDURE — 99024 POSTOP FOLLOW-UP VISIT: CPT

## 2019-05-10 RX ORDER — METRONIDAZOLE 500 MG/1
500 TABLET ORAL
Qty: 3 | Refills: 0 | Status: DISCONTINUED | COMMUNITY
Start: 2019-03-19 | End: 2019-05-10

## 2019-05-10 RX ORDER — NEOMYCIN SULFATE 500 MG/1
500 TABLET ORAL
Qty: 6 | Refills: 0 | Status: DISCONTINUED | COMMUNITY
Start: 2019-03-19 | End: 2019-05-10

## 2019-05-11 PROBLEM — Z90.49 S/P LAPAROSCOPIC-ASSISTED SIGMOIDECTOMY: Status: ACTIVE | Noted: 2019-05-11

## 2019-05-11 NOTE — PHYSICAL EXAM
[Abdomen Tenderness] : ~T No ~M abdominal tenderness [Abdomen Masses] : No abdominal masses [Normal Heart Sounds] : normal heart sounds [Normal Breath Sounds] : Normal breath sounds [Alert] : alert [Oriented to Person] : oriented to person [Normal Rate and Rhythm] : normal rate and rhythm [de-identified] : obese, NTND, surgical incision and lap site CDI, no erythema, drainage or odor. All staples were removed.  [Calm] : calm [Oriented to Place] : oriented to place [Oriented to Time] : oriented to time [de-identified] : well nourished, no distress

## 2019-05-11 NOTE — HISTORY OF PRESENT ILLNESS
[FreeTextEntry1] : 63 year old  female with colon polyp s/p laparoscopic sigmoid resection with Dr. Hernandez on 4/11/19 at . Doing well. Tolerating PO intake, ambulating, daily BMs.  Denies fever/chills, n/v.

## 2019-05-11 NOTE — ASSESSMENT
[FreeTextEntry1] : A: 63 year old female with colon polyp. s/p lap-assisted sigmoid resection. Doing well. Surgical incision and lap sites, CDI, no erythema,drainage or odor. All staples were removed.\par \par P:\par s/p lap-assisted sigmoid resection\par - liberalize diet\par - fiber, stool softeners\par - no heavy lifting for 4 more weeks\par - keep incision clean and dry, daily showers with antibacterial soap\par \par RTC in 2-3 months for follow up

## 2019-05-11 NOTE — DATA REVIEWED
[FreeTextEntry1] : surgical pathology - pre-cancerous polyp. clear margins. negative lymph nodes \par \par  Pathology             Final\par \par No Documents Attached\par \par \par \par \par   TOSIN MCNAMARA                     2\par \par \par \par Surgical Final Report\par \par \par \par \par Final Diagnosis\par \par 1. Colonic segment (sigmoid and rectosigmoid):\par - VILLOGLANDULAR POLYP WITH FOCAL HIGH GRADE\par (MODERATE) DYSPLASIA, MEASURING 5.0 CM.\par - ALL MARGINS ARE WIDELY FREE OF NEOPLASIA.\par - Sixteen lymph nodes negative for malignancy (0/16).\par - Exogenous pigment within the bowel wall and multiple lymph\par nodes ("tattoo").\par - Additional biopsy site changes including\par lamina proprial and submucosal fibrosis and\par inflammatory response.\par \par 2. Colonic segments (colorectal anastomosis):\par - Negative for malignancy.\par \par Verified by: RICKI GIBBS M.D.\par (Electronic Signature)\par Reported on: 04/16/19 15:49 EDT, [address] [place] [address]\par [id number]\par _________________________________________________________________\par \par Comment\par The 5.0 cm polypoid lesion has been serially sectioned and\par examined in its entirety.  Invasive carcinoma is not identified.\par \par Clinical History\par Sigmoid colon  cancer, sigmoid coln mass, adhesions\par \par Specimen(s) Submitted\par 1     Sigmoid, rectosigmoid colon with lymph nodes and large\par polyp mass\par 2     Colorectal anastamosis\par \par Gross Description\par 1.  The specimen is received fresh and the specimen container is\par labeled: Sigmoid/rectosigmoid colon with lymph nodes and large\par polyp mass.  It consists of a previously opened, 18.0 cm in\par length x 3.3 cm in average diameter segment of colon with a\par moderate amount of attached adipose tissue.  It is received\par stapled at one margin and open at the opposite margin.  The\par serosa is pink-tan, smooth and focally tattood near the stapled\par margin.  Underlying the tattoo area is a 5.0 x 3.0 x 0.5 cm tan\par sessile polypoid mucosal lesion that is 2.5 cm from the stapled\par margin, 12.5 cm and the open margin and 9.5 cm from the\par mesenteric margin.  Multiple possible lymph nodes are identified\par ranging from 0.3-1.7 cm in greatest dimension. Representative\par sections submitted.  23 cassettes.\par \par \par \par \par \par TOSIN MCNAMARA                     2\par \par \par \par Surgical Final Report\par \par \par \par \par A. Mesenteric margin\par B-N.  Polypoid lesion, submitted entirely\par O. Four whole lymph nodes\par P. Three whole lymph nodes\par Q-R. Two whole lymph nodes, each\par S-T.  One bisected lymph node\par U-W. Representative adipose tissue containing possible lymph\par nodes\par \par 2.  The specimen is received in formalin and the specimen\par container is labeled: Colorectal anastomosis.  It consists of two\par tan grey mucosal rings, containing sutures and staples, measuring\par 1.5 x 1.1 x 1.0 cm and 2.0 x 2.0 x 1.3 cm.  Representative\par sections submitted.  One cassette.\par \par In addition to other data that may appear on the specimen\par containers, all labels have been inspected to confirm the\par presence of the patient's name and date of birth.\par Terrance 04/15/2019 11:20\par \par  \par \par  Ordered by: AMANDA PIÑA       Collected/Examined: 11Apr2019 07:52AM       \par Verified by: NKECHI WALL [id number] 03:45PM       \par  Result Communication: Discussed results with patient;\par Stage: Final       \par  Performed at: [facility]       Resulted: 16Apr2019 03:49PM       Last Updated: 17Apr2019 03:45PM       Accession: [id number]       \par

## 2019-07-02 ENCOUNTER — APPOINTMENT (OUTPATIENT)
Dept: COLORECTAL SURGERY | Facility: CLINIC | Age: 64
End: 2019-07-02
Payer: MEDICAID

## 2019-07-02 VITALS
WEIGHT: 148 LBS | TEMPERATURE: 98.6 F | SYSTOLIC BLOOD PRESSURE: 122 MMHG | HEIGHT: 59 IN | DIASTOLIC BLOOD PRESSURE: 74 MMHG | HEART RATE: 79 BPM | BODY MASS INDEX: 29.84 KG/M2 | RESPIRATION RATE: 14 BRPM

## 2019-07-02 PROCEDURE — 99024 POSTOP FOLLOW-UP VISIT: CPT

## 2019-07-02 NOTE — HISTORY OF PRESENT ILLNESS
[FreeTextEntry1] : 63-year-old female postop sigmoid colon resection for massive polyp. Patient is doing very well denies any fevers chills nausea vomiting. Pathology demonstrates a large polyp and no cancer

## 2019-07-02 NOTE — ASSESSMENT
[FreeTextEntry1] : 63-year-old female postop sigmoid colon resection for large polyp no evidence of cancer. Recommend followup colonoscopy in one year, high fiber diet

## 2019-07-02 NOTE — PHYSICAL EXAM
[de-identified] : Incisions are well-healed [de-identified] : Looks well in no distress, of stated age.

## 2019-09-13 ENCOUNTER — APPOINTMENT (OUTPATIENT)
Dept: COLORECTAL SURGERY | Facility: CLINIC | Age: 64
End: 2019-09-13
Payer: MEDICAID

## 2019-09-13 VITALS
WEIGHT: 150 LBS | HEART RATE: 89 BPM | RESPIRATION RATE: 14 BRPM | SYSTOLIC BLOOD PRESSURE: 148 MMHG | HEIGHT: 59 IN | BODY MASS INDEX: 30.24 KG/M2 | DIASTOLIC BLOOD PRESSURE: 83 MMHG

## 2019-09-13 DIAGNOSIS — K63.5 POLYP OF COLON: ICD-10-CM

## 2019-09-13 DIAGNOSIS — C18.7 MALIGNANT NEOPLASM OF SIGMOID COLON: ICD-10-CM

## 2019-09-13 PROCEDURE — 99214 OFFICE O/P EST MOD 30 MIN: CPT

## 2019-09-14 PROBLEM — C18.7 MALIGNANT NEOPLASM OF SIGMOID COLON: Status: ACTIVE | Noted: 2019-03-19

## 2019-09-14 PROBLEM — K63.5 COLON POLYP: Status: ACTIVE | Noted: 2019-03-19

## 2019-09-14 NOTE — ASSESSMENT
[FreeTextEntry1] : 63-year-old female with previous colon surgery for a large colon polyp in need of surveillance colonoscopy.Recommend colonoscopy. Risk and benefits of colonoscopy have been discussed which include but not limited to bleeding, infection, perforation, missing a polyp or cancer occurring 5%. Complications related to the preparation including dehydration and renal failure.

## 2019-09-14 NOTE — PHYSICAL EXAM
[Abdomen Masses] : No abdominal masses [Abdomen Tenderness] : ~T No ~M abdominal tenderness [Tender] : nontender [JVD] : no jugular venous distention  [Normal Breath Sounds] : Normal breath sounds [Normal Heart Sounds] : normal heart sounds [Normal Rate and Rhythm] : normal rate and rhythm [No Rash or Lesion] : No rash or lesion [Alert] : alert [Oriented to Person] : oriented to person [Oriented to Place] : oriented to place [Oriented to Time] : oriented to time [Calm] : calm [de-identified] : Looks well in no distress, of stated age. [de-identified] : pupils equal reactive to light normocephalic atraumatic. [de-identified] : moves all 4 extremities appropriately with 5 over 5 strength

## 2019-09-14 NOTE — HISTORY OF PRESENT ILLNESS
[FreeTextEntry1] : 63-year-old female with previous history of colon resection for large polyp here for follow up feeling very well denies any pain or bleeding in need of a colonoscopy.

## 2019-11-15 ENCOUNTER — OUTPATIENT (OUTPATIENT)
Dept: OUTPATIENT SERVICES | Facility: HOSPITAL | Age: 64
LOS: 1 days | End: 2019-11-15
Payer: MEDICAID

## 2019-11-15 VITALS
TEMPERATURE: 97 F | HEIGHT: 55 IN | DIASTOLIC BLOOD PRESSURE: 78 MMHG | WEIGHT: 143.3 LBS | HEART RATE: 64 BPM | SYSTOLIC BLOOD PRESSURE: 118 MMHG | RESPIRATION RATE: 16 BRPM

## 2019-11-15 DIAGNOSIS — C18.7 MALIGNANT NEOPLASM OF SIGMOID COLON: ICD-10-CM

## 2019-11-15 DIAGNOSIS — Z01.818 ENCOUNTER FOR OTHER PREPROCEDURAL EXAMINATION: ICD-10-CM

## 2019-11-15 DIAGNOSIS — Z98.891 HISTORY OF UTERINE SCAR FROM PREVIOUS SURGERY: Chronic | ICD-10-CM

## 2019-11-15 DIAGNOSIS — Z90.49 ACQUIRED ABSENCE OF OTHER SPECIFIED PARTS OF DIGESTIVE TRACT: Chronic | ICD-10-CM

## 2019-11-15 DIAGNOSIS — I10 ESSENTIAL (PRIMARY) HYPERTENSION: ICD-10-CM

## 2019-11-15 DIAGNOSIS — Z90.710 ACQUIRED ABSENCE OF BOTH CERVIX AND UTERUS: Chronic | ICD-10-CM

## 2019-11-15 DIAGNOSIS — Z98.51 TUBAL LIGATION STATUS: Chronic | ICD-10-CM

## 2019-11-15 LAB
ALBUMIN SERPL ELPH-MCNC: 4.6 G/DL — SIGNIFICANT CHANGE UP (ref 3.3–5.2)
ALP SERPL-CCNC: 114 U/L — SIGNIFICANT CHANGE UP (ref 40–120)
ALT FLD-CCNC: 17 U/L — SIGNIFICANT CHANGE UP
ANION GAP SERPL CALC-SCNC: 12 MMOL/L — SIGNIFICANT CHANGE UP (ref 5–17)
AST SERPL-CCNC: 24 U/L — SIGNIFICANT CHANGE UP
BILIRUB SERPL-MCNC: 0.4 MG/DL — SIGNIFICANT CHANGE UP (ref 0.4–2)
BUN SERPL-MCNC: 12 MG/DL — SIGNIFICANT CHANGE UP (ref 8–20)
CALCIUM SERPL-MCNC: 9.8 MG/DL — SIGNIFICANT CHANGE UP (ref 8.6–10.2)
CHLORIDE SERPL-SCNC: 101 MMOL/L — SIGNIFICANT CHANGE UP (ref 98–107)
CHOLEST SERPL-MCNC: 187 MG/DL — SIGNIFICANT CHANGE UP (ref 110–199)
CO2 SERPL-SCNC: 27 MMOL/L — SIGNIFICANT CHANGE UP (ref 22–29)
CREAT SERPL-MCNC: 0.68 MG/DL — SIGNIFICANT CHANGE UP (ref 0.5–1.3)
GLUCOSE SERPL-MCNC: 113 MG/DL — SIGNIFICANT CHANGE UP (ref 70–115)
HCT VFR BLD CALC: 45.3 % — HIGH (ref 34.5–45)
HDLC SERPL-MCNC: 63 MG/DL — SIGNIFICANT CHANGE UP
HGB BLD-MCNC: 15 G/DL — SIGNIFICANT CHANGE UP (ref 11.5–15.5)
LIPID PNL WITH DIRECT LDL SERPL: 89 MG/DL — SIGNIFICANT CHANGE UP
MCHC RBC-ENTMCNC: 30.3 PG — SIGNIFICANT CHANGE UP (ref 27–34)
MCHC RBC-ENTMCNC: 33.1 GM/DL — SIGNIFICANT CHANGE UP (ref 32–36)
MCV RBC AUTO: 91.5 FL — SIGNIFICANT CHANGE UP (ref 80–100)
PLATELET # BLD AUTO: 244 K/UL — SIGNIFICANT CHANGE UP (ref 150–400)
POTASSIUM SERPL-MCNC: 4.4 MMOL/L — SIGNIFICANT CHANGE UP (ref 3.5–5.3)
POTASSIUM SERPL-SCNC: 4.4 MMOL/L — SIGNIFICANT CHANGE UP (ref 3.5–5.3)
PROT SERPL-MCNC: 8 G/DL — SIGNIFICANT CHANGE UP (ref 6.6–8.7)
RBC # BLD: 4.95 M/UL — SIGNIFICANT CHANGE UP (ref 3.8–5.2)
RBC # FLD: 12.5 % — SIGNIFICANT CHANGE UP (ref 10.3–14.5)
SODIUM SERPL-SCNC: 140 MMOL/L — SIGNIFICANT CHANGE UP (ref 135–145)
TOTAL CHOLESTEROL/HDL RATIO MEASUREMENT: 3 RATIO — LOW (ref 3.3–7.1)
TRIGL SERPL-MCNC: 176 MG/DL — SIGNIFICANT CHANGE UP (ref 10–200)
WBC # BLD: 7.21 K/UL — SIGNIFICANT CHANGE UP (ref 3.8–10.5)
WBC # FLD AUTO: 7.21 K/UL — SIGNIFICANT CHANGE UP (ref 3.8–10.5)

## 2019-11-15 PROCEDURE — 80061 LIPID PANEL: CPT

## 2019-11-15 PROCEDURE — 80053 COMPREHEN METABOLIC PANEL: CPT

## 2019-11-15 PROCEDURE — 36415 COLL VENOUS BLD VENIPUNCTURE: CPT

## 2019-11-15 PROCEDURE — 85027 COMPLETE CBC AUTOMATED: CPT

## 2019-11-15 PROCEDURE — G0463: CPT

## 2019-11-15 PROCEDURE — 93010 ELECTROCARDIOGRAM REPORT: CPT

## 2019-11-15 PROCEDURE — 93005 ELECTROCARDIOGRAM TRACING: CPT

## 2019-11-15 RX ORDER — CHOLECALCIFEROL (VITAMIN D3) 125 MCG
0 CAPSULE ORAL
Qty: 0 | Refills: 0 | DISCHARGE

## 2019-11-15 RX ORDER — LISINOPRIL 2.5 MG/1
1 TABLET ORAL
Qty: 0 | Refills: 0 | DISCHARGE

## 2019-11-15 NOTE — H&P PST ADULT - HISTORY OF PRESENT ILLNESS
63 yo female with history of hypertension and colon cancer s/p colon resection in April 2019, planning colonoscopy.  Pt c/o constipation denies abdominal pain, melena or BRBPR.

## 2019-11-15 NOTE — H&P PST ADULT - NSICDXPROBLEM_GEN_ALL_CORE_FT
PROBLEM DIAGNOSES  Problem: HTN (hypertension)  Assessment and Plan: Preop medical eval.    Problem: Cancer of sigmoid colon  Assessment and Plan: Colonoscopy

## 2019-11-15 NOTE — H&P PST ADULT - NSICDXPASTSURGICALHX_GEN_ALL_CORE_FT
PAST SURGICAL HISTORY:  S/P      S/P colon resection     S/P hysterectomy due to prolapse     S/P tubal ligation

## 2019-11-20 ENCOUNTER — OTHER (OUTPATIENT)
Age: 64
End: 2019-11-20

## 2019-11-20 VITALS
HEIGHT: 59 IN | SYSTOLIC BLOOD PRESSURE: 145 MMHG | HEART RATE: 88 BPM | WEIGHT: 150 LBS | BODY MASS INDEX: 30.24 KG/M2 | RESPIRATION RATE: 16 BRPM | DIASTOLIC BLOOD PRESSURE: 82 MMHG | TEMPERATURE: 98.4 F

## 2019-11-20 NOTE — PHYSICAL EXAM
[Abdomen Masses] : No abdominal masses [JVD] : no jugular venous distention  [Abdomen Tenderness] : ~T No ~M abdominal tenderness [Tender] : nontender [Normal Breath Sounds] : Normal breath sounds [Normal Heart Sounds] : normal heart sounds [Normal Rate and Rhythm] : normal rate and rhythm [No Rash or Lesion] : No rash or lesion [Alert] : alert [Oriented to Person] : oriented to person [Oriented to Place] : oriented to place [Calm] : calm [Oriented to Time] : oriented to time [de-identified] : moves all 4 extremities appropriately with 5 over 5 strength [de-identified] : Looks well in no distress, of stated age. [de-identified] : pupils equal reactive to light normocephalic atraumatic.

## 2019-11-22 ENCOUNTER — RESULT REVIEW (OUTPATIENT)
Age: 64
End: 2019-11-22

## 2019-11-22 ENCOUNTER — APPOINTMENT (OUTPATIENT)
Dept: COLORECTAL SURGERY | Facility: HOSPITAL | Age: 64
End: 2019-11-22
Payer: MEDICAID

## 2019-11-22 ENCOUNTER — OUTPATIENT (OUTPATIENT)
Dept: OUTPATIENT SERVICES | Facility: HOSPITAL | Age: 64
LOS: 1 days | End: 2019-11-22
Payer: MEDICAID

## 2019-11-22 DIAGNOSIS — Z98.51 TUBAL LIGATION STATUS: Chronic | ICD-10-CM

## 2019-11-22 DIAGNOSIS — Z90.710 ACQUIRED ABSENCE OF BOTH CERVIX AND UTERUS: Chronic | ICD-10-CM

## 2019-11-22 DIAGNOSIS — Z98.891 HISTORY OF UTERINE SCAR FROM PREVIOUS SURGERY: Chronic | ICD-10-CM

## 2019-11-22 DIAGNOSIS — Z90.49 ACQUIRED ABSENCE OF OTHER SPECIFIED PARTS OF DIGESTIVE TRACT: Chronic | ICD-10-CM

## 2019-11-22 DIAGNOSIS — K63.5 POLYP OF COLON: ICD-10-CM

## 2019-11-22 PROCEDURE — 88305 TISSUE EXAM BY PATHOLOGIST: CPT

## 2019-11-22 PROCEDURE — 45380 COLONOSCOPY AND BIOPSY: CPT | Mod: PT

## 2019-11-22 PROCEDURE — 45378 DIAGNOSTIC COLONOSCOPY: CPT

## 2019-11-22 PROCEDURE — 88305 TISSUE EXAM BY PATHOLOGIST: CPT | Mod: 26

## 2019-11-26 LAB — SURGICAL PATHOLOGY STUDY: SIGNIFICANT CHANGE UP

## 2019-11-27 ENCOUNTER — OTHER (OUTPATIENT)
Age: 64
End: 2019-11-27

## 2022-12-21 ENCOUNTER — OFFICE (OUTPATIENT)
Dept: URBAN - METROPOLITAN AREA CLINIC 94 | Facility: CLINIC | Age: 67
Setting detail: OPHTHALMOLOGY
End: 2022-12-21
Payer: MEDICAID

## 2022-12-21 DIAGNOSIS — H04.121: ICD-10-CM

## 2022-12-21 DIAGNOSIS — H40.1132: ICD-10-CM

## 2022-12-21 DIAGNOSIS — H04.123: ICD-10-CM

## 2022-12-21 PROCEDURE — 92250 FUNDUS PHOTOGRAPHY W/I&R: CPT | Performed by: OPHTHALMOLOGY

## 2022-12-21 PROCEDURE — 92014 COMPRE OPH EXAM EST PT 1/>: CPT | Performed by: OPHTHALMOLOGY

## 2022-12-21 ASSESSMENT — CONFRONTATIONAL VISUAL FIELD TEST (CVF)
OD_FINDINGS: FULL
OS_FINDINGS: FULL

## 2022-12-21 ASSESSMENT — PACHYMETRY
OD_CT_CORRECTION: -3
OD_CT_UM: 584
OS_CT_CORRECTION: -3
OS_CT_UM: 583

## 2022-12-21 ASSESSMENT — SPHEQUIV_DERIVED
OS_SPHEQUIV: 0
OS_SPHEQUIV: 2.25
OD_SPHEQUIV: 0.25
OS_SPHEQUIV: 1.75
OD_SPHEQUIV: 0.375
OD_SPHEQUIV: 1.75

## 2022-12-21 ASSESSMENT — REFRACTION_MANIFEST
OD_AXIS: 105
OD_ADD: +2.25
OD_SPHERE: +2.00
OD_CYLINDER: -0.50
OS_VA1: 20/40
OS_AXIS: 107
OD_VA1: 20/25
OD_AXIS: 097
OS_SPHERE: +2.25
OD_SPHERE: +0.50
OS_CYLINDER: -0.75
OD_CYLINDER: -0.50
OS_VA1: 20/20-
OS_AXIS: 095
OS_ADD: +2.25
OD_VA1: 20/20
OD_SPHERE: PLANO
OD_AXIS: 095
OS_CYLINDER: -1.50
OD_CYLINDER: -0.50
OS_VA1: 20/40
OD_VA1: 20/40
OS_SPHERE: PLANO
OS_AXIS: 100
OS_CYLINDER: -1.00
OS_SPHERE: +3.00

## 2022-12-21 ASSESSMENT — AXIALLENGTH_DERIVED
OD_AL: 23.4921
OS_AL: 22.8755
OS_AL: 23.0601
OD_AL: 23.5405
OD_AL: 22.9727
OS_AL: 23.7305

## 2022-12-21 ASSESSMENT — REFRACTION_AUTOREFRACTION
OD_SPHERE: +0.75
OD_CYLINDER: -0.75
OS_SPHERE: +0.75
OS_CYLINDER: -1.50
OD_AXIS: 098
OS_AXIS: 099

## 2022-12-21 ASSESSMENT — KERATOMETRY
OS_AXISANGLE_DEGREES: 016
OS_K1POWER_DIOPTERS: 42.75
OD_AXISANGLE_DEGREES: 019
METHOD_AUTO_MANUAL: AUTO
OD_K2POWER_DIOPTERS: 43.50
OS_K2POWER_DIOPTERS: 43.50
OD_K1POWER_DIOPTERS: 43.25

## 2022-12-21 ASSESSMENT — TONOMETRY
OD_IOP_MMHG: 16
OS_IOP_MMHG: 16

## 2022-12-21 ASSESSMENT — VISUAL ACUITY
OD_BCVA: 20/25-1
OS_BCVA: 20/25-1

## 2023-04-27 NOTE — PATIENT PROFILE ADULT - DO YOU FEEL THREATENED BY OTHERS?
no Terbinafine Counseling: Patient counseling regarding adverse effects of terbinafine including but not limited to headache, diarrhea, rash, upset stomach, liver function test abnormalities, itching, taste/smell disturbance, nausea, abdominal pain, and flatulence.  There is a rare possibility of liver failure that can occur when taking terbinafine.  The patient understands that a baseline LFT and kidney function test may be required. The patient verbalized understanding of the proper use and possible adverse effects of terbinafine.  All of the patient's questions and concerns were addressed.

## 2024-04-12 ENCOUNTER — RX ONLY (RX ONLY)
Age: 69
End: 2024-04-12

## 2024-04-12 ENCOUNTER — OFFICE (OUTPATIENT)
Dept: URBAN - METROPOLITAN AREA CLINIC 94 | Facility: CLINIC | Age: 69
Setting detail: OPHTHALMOLOGY
End: 2024-04-12
Payer: MEDICAID

## 2024-04-12 DIAGNOSIS — H04.123: ICD-10-CM

## 2024-04-12 DIAGNOSIS — H40.1132: ICD-10-CM

## 2024-04-12 PROCEDURE — 92020 GONIOSCOPY: CPT | Performed by: OPHTHALMOLOGY

## 2024-04-12 PROCEDURE — 92012 INTRM OPH EXAM EST PATIENT: CPT | Performed by: OPHTHALMOLOGY

## 2024-04-12 PROCEDURE — 92083 EXTENDED VISUAL FIELD XM: CPT | Performed by: OPHTHALMOLOGY

## 2024-04-12 PROCEDURE — 92133 CPTRZD OPH DX IMG PST SGM ON: CPT | Performed by: OPHTHALMOLOGY

## 2024-08-09 ENCOUNTER — OFFICE (OUTPATIENT)
Dept: URBAN - METROPOLITAN AREA CLINIC 94 | Facility: CLINIC | Age: 69
Setting detail: OPHTHALMOLOGY
End: 2024-08-09
Payer: MEDICAID

## 2024-08-09 DIAGNOSIS — H04.123: ICD-10-CM

## 2024-08-09 DIAGNOSIS — Z96.1: ICD-10-CM

## 2024-08-09 DIAGNOSIS — H40.1132: ICD-10-CM

## 2024-08-09 PROCEDURE — 92250 FUNDUS PHOTOGRAPHY W/I&R: CPT | Performed by: OPHTHALMOLOGY

## 2024-08-09 PROCEDURE — 99213 OFFICE O/P EST LOW 20 MIN: CPT | Performed by: OPHTHALMOLOGY

## 2024-08-09 ASSESSMENT — CONFRONTATIONAL VISUAL FIELD TEST (CVF)
OS_FINDINGS: FULL
OD_FINDINGS: FULL

## 2024-12-13 ENCOUNTER — OFFICE (OUTPATIENT)
Dept: URBAN - METROPOLITAN AREA CLINIC 94 | Facility: CLINIC | Age: 69
Setting detail: OPHTHALMOLOGY
End: 2024-12-13
Payer: MEDICAID

## 2024-12-13 DIAGNOSIS — Z96.1: ICD-10-CM

## 2024-12-13 DIAGNOSIS — H40.1132: ICD-10-CM

## 2024-12-13 DIAGNOSIS — H04.122: ICD-10-CM

## 2024-12-13 DIAGNOSIS — H04.121: ICD-10-CM

## 2024-12-13 DIAGNOSIS — H04.123: ICD-10-CM

## 2024-12-13 PROBLEM — H26.493 POSTERIOR CAPSULE OPACITY; BOTH EYES: Status: ACTIVE | Noted: 2024-12-13

## 2024-12-13 PROCEDURE — 92012 INTRM OPH EXAM EST PATIENT: CPT | Performed by: OPHTHALMOLOGY

## 2024-12-13 ASSESSMENT — REFRACTION_MANIFEST
OD_VA1: 20/20
OS_ADD: +2.25
OD_SPHERE: PLANO
OD_VA1: 20/40
OD_SPHERE: +2.00
OD_AXIS: 095
OS_VA1: 20/20-
OS_SPHERE: PLANO
OD_AXIS: 105
OD_ADD: +2.25
OS_VA1: 20/40
OD_CYLINDER: -0.50
OS_AXIS: 100
OD_VA1: 20/25
OS_CYLINDER: -1.00
OD_SPHERE: +0.50
OS_VA1: 20/40
OS_AXIS: 095
OD_CYLINDER: -0.50
OD_AXIS: 097
OS_SPHERE: +2.25
OS_SPHERE: +3.00
OS_AXIS: 107
OD_CYLINDER: -0.50
OS_CYLINDER: -0.75
OS_CYLINDER: -1.50

## 2024-12-13 ASSESSMENT — REFRACTION_AUTOREFRACTION
OS_SPHERE: +0.50
OS_CYLINDER: -1.25
OD_AXIS: 098
OD_CYLINDER: -1.00
OS_AXIS: 096
OD_SPHERE: +0.75

## 2024-12-13 ASSESSMENT — VISUAL ACUITY
OD_BCVA: 20/40+1
OS_BCVA: 20/30

## 2024-12-13 ASSESSMENT — KERATOMETRY
OS_K1POWER_DIOPTERS: 43.25
OS_K2POWER_DIOPTERS: 43.75
OS_AXISANGLE_DEGREES: 052
OD_K2POWER_DIOPTERS: 43.50
OD_AXISANGLE_DEGREES: 046
OD_K1POWER_DIOPTERS: 43.25
METHOD_AUTO_MANUAL: AUTO

## 2024-12-13 ASSESSMENT — PACHYMETRY
OD_CT_CORRECTION: -3
OD_CT_UM: 584
OS_CT_CORRECTION: -3
OS_CT_UM: 583

## 2024-12-13 ASSESSMENT — TONOMETRY
OD_IOP_MMHG: 14
OS_IOP_MMHG: 14

## 2024-12-13 ASSESSMENT — CONFRONTATIONAL VISUAL FIELD TEST (CVF)
OS_FINDINGS: FULL
OD_FINDINGS: FULL

## 2025-05-02 ENCOUNTER — OFFICE (OUTPATIENT)
Dept: URBAN - METROPOLITAN AREA CLINIC 94 | Facility: CLINIC | Age: 70
Setting detail: OPHTHALMOLOGY
End: 2025-05-02
Payer: MEDICAID

## 2025-05-02 DIAGNOSIS — H40.1132: ICD-10-CM

## 2025-05-02 DIAGNOSIS — H04.123: ICD-10-CM

## 2025-05-02 PROCEDURE — 92133 CPTRZD OPH DX IMG PST SGM ON: CPT | Performed by: OPHTHALMOLOGY

## 2025-05-02 PROCEDURE — 92014 COMPRE OPH EXAM EST PT 1/>: CPT | Performed by: OPHTHALMOLOGY

## 2025-05-02 PROCEDURE — 92083 EXTENDED VISUAL FIELD XM: CPT | Performed by: OPHTHALMOLOGY

## 2025-05-02 ASSESSMENT — KERATOMETRY
METHOD_AUTO_MANUAL: AUTO
OD_K1POWER_DIOPTERS: 43.25
OS_AXISANGLE_DEGREES: 052
OS_K2POWER_DIOPTERS: 43.75
OD_AXISANGLE_DEGREES: 046
OD_K2POWER_DIOPTERS: 43.50
OS_K1POWER_DIOPTERS: 43.25

## 2025-05-02 ASSESSMENT — REFRACTION_MANIFEST
OD_VA1: 20/25
OD_AXIS: 095
OD_VA1: 20/25
OS_SPHERE: PLANO
OD_AXIS: 097
OS_AXIS: 100
OD_CYLINDER: -0.50
OD_CYLINDER: -1.00
OS_AXIS: 094
OS_ADD: +2.25
OS_SPHERE: +2.25
OS_CYLINDER: -1.50
OD_CYLINDER: -0.50
OD_CYLINDER: -0.50
OS_CYLINDER: -0.75
OS_CYLINDER: -1.75
OS_SPHERE: +1.00
OD_VA1: 20/40
OS_VA1: 20/30
OD_SPHERE: +2.00
OD_AXIS: 084
OD_SPHERE: +0.50
OS_SPHERE: +3.00
OS_VA1: 20/20-
OS_VA1: 20/40
OD_SPHERE: PLANO
OD_VA1: 20/20
OS_AXIS: 095
OS_CYLINDER: -1.00
OD_SPHERE: +0.75
OD_ADD: +2.25
OS_AXIS: 107
OS_VA1: 20/40
OD_AXIS: 105

## 2025-05-02 ASSESSMENT — REFRACTION_AUTOREFRACTION
OS_CYLINDER: -1.75
OS_AXIS: 094
OD_CYLINDER: -1.00
OD_SPHERE: +0.75
OD_AXIS: 084
OS_SPHERE: +1.00

## 2025-05-02 ASSESSMENT — CONFRONTATIONAL VISUAL FIELD TEST (CVF)
OS_FINDINGS: FULL
OD_FINDINGS: FULL

## 2025-05-02 ASSESSMENT — PACHYMETRY
OD_CT_CORRECTION: -3
OS_CT_UM: 583
OS_CT_CORRECTION: -3
OD_CT_UM: 584

## 2025-05-02 ASSESSMENT — VISUAL ACUITY
OS_BCVA: 20/25
OD_BCVA: 20/30-1

## 2025-05-02 ASSESSMENT — TONOMETRY
OS_IOP_MMHG: 15
OD_IOP_MMHG: 15

## 2025-05-16 ENCOUNTER — ASC (OUTPATIENT)
Dept: URBAN - METROPOLITAN AREA SURGERY 8 | Facility: SURGERY | Age: 70
Setting detail: OPHTHALMOLOGY
End: 2025-05-16
Payer: MEDICAID

## 2025-05-16 DIAGNOSIS — H26.491: ICD-10-CM

## 2025-05-16 PROBLEM — H26.492 POSTERIOR CAPSULE OPACITY; RIGHT EYE, LEFT EYE: Status: ACTIVE | Noted: 2025-05-02

## 2025-05-16 PROCEDURE — 66821 AFTER CATARACT LASER SURGERY: CPT | Mod: RT | Performed by: OPHTHALMOLOGY

## 2025-05-16 ASSESSMENT — REFRACTION_MANIFEST
OS_AXIS: 094
OD_SPHERE: PLANO
OD_AXIS: 084
OD_CYLINDER: -1.00
OS_ADD: +2.25
OD_VA1: 20/40
OS_CYLINDER: -1.50
OD_CYLINDER: -0.50
OD_CYLINDER: -0.50
OS_AXIS: 100
OD_ADD: +2.25
OS_SPHERE: +2.25
OS_VA1: 20/40
OS_SPHERE: +1.00
OS_VA1: 20/40
OS_CYLINDER: -1.75
OD_VA1: 20/25
OD_VA1: 20/20
OD_SPHERE: +0.75
OD_SPHERE: +0.50
OD_AXIS: 095
OD_VA1: 20/25
OD_CYLINDER: -0.50
OS_VA1: 20/30
OS_VA1: 20/20-
OS_CYLINDER: -0.75
OS_AXIS: 095
OD_SPHERE: +2.00
OS_CYLINDER: -1.00
OS_AXIS: 107
OD_AXIS: 097
OS_SPHERE: +3.00
OS_SPHERE: PLANO
OD_AXIS: 105

## 2025-05-16 ASSESSMENT — KERATOMETRY
METHOD_AUTO_MANUAL: AUTO
OD_K1POWER_DIOPTERS: 43.25
OS_AXISANGLE_DEGREES: 052
OD_AXISANGLE_DEGREES: 046
OS_K2POWER_DIOPTERS: 43.75
OS_K1POWER_DIOPTERS: 43.25
OD_K2POWER_DIOPTERS: 43.50

## 2025-05-16 ASSESSMENT — REFRACTION_AUTOREFRACTION
OD_CYLINDER: -1.00
OS_SPHERE: +1.00
OD_AXIS: 084
OS_CYLINDER: -1.75
OS_AXIS: 094
OD_SPHERE: +0.75

## 2025-05-16 ASSESSMENT — VISUAL ACUITY
OD_BCVA: 20/30-1
OS_BCVA: 20/25

## 2025-07-30 ENCOUNTER — ASC (OUTPATIENT)
Dept: URBAN - METROPOLITAN AREA SURGERY 8 | Facility: SURGERY | Age: 70
Setting detail: OPHTHALMOLOGY
End: 2025-07-30
Payer: MEDICAID

## 2025-07-30 DIAGNOSIS — H26.492: ICD-10-CM

## 2025-07-30 PROCEDURE — 66821 AFTER CATARACT LASER SURGERY: CPT | Mod: 79,LT | Performed by: OPHTHALMOLOGY

## 2025-07-30 ASSESSMENT — KERATOMETRY
OD_AXISANGLE_DEGREES: 046
OD_K2POWER_DIOPTERS: 43.50
OS_K2POWER_DIOPTERS: 43.75
METHOD_AUTO_MANUAL: AUTO
OS_AXISANGLE_DEGREES: 052
OD_K1POWER_DIOPTERS: 43.25
OS_K1POWER_DIOPTERS: 43.25

## 2025-07-30 ASSESSMENT — REFRACTION_MANIFEST
OS_SPHERE: +2.25
OS_CYLINDER: -1.75
OD_CYLINDER: -0.50
OD_AXIS: 084
OS_AXIS: 100
OD_VA1: 20/25
OS_AXIS: 107
OD_SPHERE: PLANO
OS_CYLINDER: -1.50
OD_SPHERE: +2.00
OD_AXIS: 105
OD_SPHERE: +0.50
OS_SPHERE: +3.00
OD_SPHERE: +0.75
OD_VA1: 20/25
OD_ADD: +2.25
OS_ADD: +2.25
OS_AXIS: 094
OS_SPHERE: +1.00
OS_VA1: 20/40
OD_CYLINDER: -1.00
OS_VA1: 20/40
OS_VA1: 20/20-
OS_AXIS: 095
OD_CYLINDER: -0.50
OS_CYLINDER: -0.75
OS_SPHERE: PLANO
OD_CYLINDER: -0.50
OD_VA1: 20/40
OS_VA1: 20/30
OD_AXIS: 097
OD_VA1: 20/20
OD_AXIS: 095
OS_CYLINDER: -1.00

## 2025-07-30 ASSESSMENT — VISUAL ACUITY
OS_BCVA: 20/25
OD_BCVA: 20/30-1

## 2025-07-30 ASSESSMENT — REFRACTION_AUTOREFRACTION
OD_AXIS: 084
OS_AXIS: 094
OS_SPHERE: +1.00
OD_SPHERE: +0.75
OD_CYLINDER: -1.00
OS_CYLINDER: -1.75

## 2025-08-27 ENCOUNTER — OFFICE (OUTPATIENT)
Dept: URBAN - METROPOLITAN AREA CLINIC 94 | Facility: CLINIC | Age: 70
Setting detail: OPHTHALMOLOGY
End: 2025-08-27

## 2025-08-27 DIAGNOSIS — H26.491: ICD-10-CM

## 2025-08-27 DIAGNOSIS — H26.492: ICD-10-CM

## 2025-08-27 PROCEDURE — 99024 POSTOP FOLLOW-UP VISIT: CPT | Performed by: PHYSICIAN ASSISTANT

## 2025-08-27 ASSESSMENT — PACHYMETRY
OS_CT_CORRECTION: -3
OD_CT_UM: 584
OD_CT_CORRECTION: -3
OS_CT_UM: 583

## 2025-08-27 ASSESSMENT — CONFRONTATIONAL VISUAL FIELD TEST (CVF)
OD_FINDINGS: FULL
OS_FINDINGS: FULL

## 2025-08-27 ASSESSMENT — TONOMETRY
OS_IOP_MMHG: 20
OS_IOP_MMHG: 18
OD_IOP_MMHG: 20
OD_IOP_MMHG: 18

## 2025-08-28 ASSESSMENT — REFRACTION_MANIFEST
OS_VA1: 20/20-
OD_CYLINDER: -0.50
OS_VA1: 20/30
OS_ADD: +2.25
OD_AXIS: 105
OS_CYLINDER: -1.00
OD_CYLINDER: -1.00
OD_VA1: 20/20
OS_SPHERE: +1.00
OS_SPHERE: PLANO
OD_VA1: 20/25
OS_AXIS: 095
OD_SPHERE: +0.50
OD_VA1: 20/40
OS_AXIS: 107
OD_SPHERE: +0.75
OD_VA1: 20/25
OS_CYLINDER: -1.50
OS_CYLINDER: -1.75
OD_ADD: +2.25
OS_SPHERE: +2.25
OD_CYLINDER: -0.50
OD_AXIS: 084
OS_CYLINDER: -0.75
OD_CYLINDER: -0.50
OD_SPHERE: PLANO
OD_SPHERE: +2.00
OS_AXIS: 100
OD_AXIS: 097
OS_VA1: 20/40
OS_VA1: 20/40
OS_AXIS: 094
OD_AXIS: 095
OS_SPHERE: +3.00

## 2025-08-28 ASSESSMENT — VISUAL ACUITY
OD_BCVA: 20/30
OS_BCVA: 20/25

## 2025-08-28 ASSESSMENT — REFRACTION_AUTOREFRACTION
OS_SPHERE: +0.75
OD_CYLINDER: -1.00
OD_SPHERE: +1.00
OD_AXIS: 100
OS_CYLINDER: -1.50
OS_AXIS: 100

## 2025-08-28 ASSESSMENT — KERATOMETRY
OS_AXISANGLE_DEGREES: 010
OS_K2POWER_DIOPTERS: 43.50
OD_K2POWER_DIOPTERS: 43.50
OD_K1POWER_DIOPTERS: 43.50
OD_AXISANGLE_DEGREES: 090
OS_K1POWER_DIOPTERS: 42.75
METHOD_AUTO_MANUAL: AUTO